# Patient Record
Sex: MALE | Race: ASIAN | NOT HISPANIC OR LATINO | URBAN - METROPOLITAN AREA
[De-identification: names, ages, dates, MRNs, and addresses within clinical notes are randomized per-mention and may not be internally consistent; named-entity substitution may affect disease eponyms.]

---

## 2023-03-16 ENCOUNTER — OUTPATIENT (OUTPATIENT)
Dept: OUTPATIENT SERVICES | Facility: HOSPITAL | Age: 23
LOS: 1 days | End: 2023-03-16

## 2023-03-16 VITALS
OXYGEN SATURATION: 99 % | DIASTOLIC BLOOD PRESSURE: 78 MMHG | HEART RATE: 78 BPM | RESPIRATION RATE: 16 BRPM | HEIGHT: 67 IN | WEIGHT: 199.96 LBS | TEMPERATURE: 98 F | SYSTOLIC BLOOD PRESSURE: 114 MMHG

## 2023-03-16 DIAGNOSIS — M26.02 MAXILLARY HYPOPLASIA: ICD-10-CM

## 2023-03-16 DIAGNOSIS — M26.03 MANDIBULAR HYPERPLASIA: ICD-10-CM

## 2023-03-16 LAB
BLD GP AB SCN SERPL QL: NEGATIVE — SIGNIFICANT CHANGE UP
HCT VFR BLD CALC: 46.7 % — SIGNIFICANT CHANGE UP (ref 39–50)
HGB BLD-MCNC: 14.8 G/DL — SIGNIFICANT CHANGE UP (ref 13–17)
MCHC RBC-ENTMCNC: 26 PG — LOW (ref 27–34)
MCHC RBC-ENTMCNC: 31.7 GM/DL — LOW (ref 32–36)
MCV RBC AUTO: 81.9 FL — SIGNIFICANT CHANGE UP (ref 80–100)
NRBC # BLD: 0 /100 WBCS — SIGNIFICANT CHANGE UP (ref 0–0)
NRBC # FLD: 0 K/UL — SIGNIFICANT CHANGE UP (ref 0–0)
PLATELET # BLD AUTO: 356 K/UL — SIGNIFICANT CHANGE UP (ref 150–400)
RBC # BLD: 5.7 M/UL — SIGNIFICANT CHANGE UP (ref 4.2–5.8)
RBC # FLD: 13.7 % — SIGNIFICANT CHANGE UP (ref 10.3–14.5)
RH IG SCN BLD-IMP: POSITIVE — SIGNIFICANT CHANGE UP
WBC # BLD: 9.56 K/UL — SIGNIFICANT CHANGE UP (ref 3.8–10.5)
WBC # FLD AUTO: 9.56 K/UL — SIGNIFICANT CHANGE UP (ref 3.8–10.5)

## 2023-03-16 RX ORDER — SODIUM CHLORIDE 9 MG/ML
1000 INJECTION, SOLUTION INTRAVENOUS
Refills: 0 | Status: DISCONTINUED | OUTPATIENT
Start: 2023-03-21 | End: 2023-03-22

## 2023-03-16 NOTE — H&P PST ADULT - PROBLEM SELECTOR PLAN 1
Patient tentatively scheduled for  LeFort I osteotomy, bilateral sagittal split osteotomies on 3/21/23.  Pre-op instructions provided. Pt given verbal and written instructions with teach back on pepcid. Pt verbalized understanding with return demonstration.   Preop Covid PCR test ordered .Instructions regarding covid PCR test to be obtained 3- 5 days prior to surgery and locations for covid testing site provided. Pt verbalized understanding Patient tentatively scheduled for  LeFort I osteotomy, bilateral sagittal split osteotomies on 3/21/23.  Pre-op instructions provided. Pt given verbal and written instructions with teach back on Pepcid. Pt verbalized understanding with return demonstration.   Preop Covid PCR test ordered .Instructions regarding covid PCR test to be obtained 3- 5 days prior to surgery and locations for covid testing site provided. Pt verbalized understanding

## 2023-03-16 NOTE — H&P PST ADULT - ENMT COMMENTS
Upper and lower braces Denies dentures. Denies loose teeth.  preop dx: mandibular hyperplasia Mallampati Score -3

## 2023-03-16 NOTE — H&P PST ADULT - HISTORY OF PRESENT ILLNESS
22 year old male with no PMH  presents to Presurgical testing with diagnosis of  mandibular hyperplasia scheduled for LeFort I osteotomy, bilateral sagittal split osteotomies

## 2023-03-16 NOTE — H&P PST ADULT - NSANTHOSAYNRD_GEN_A_CORE
No. YASMIN screening performed.  STOP BANG Legend: 0-2 = LOW Risk; 3-4 = INTERMEDIATE Risk; 5-8 = HIGH Risk

## 2023-03-20 NOTE — ASU PATIENT PROFILE, ADULT - FALL HARM RISK - UNIVERSAL INTERVENTIONS
Bed in lowest position, wheels locked, appropriate side rails in place/Call bell, personal items and telephone in reach/Instruct patient to call for assistance before getting out of bed or chair/Non-slip footwear when patient is out of bed/[place] to call system/Physically safe environment - no spills, clutter or unnecessary equipment/Purposeful Proactive Rounding/Room/bathroom lighting operational, light cord in reach

## 2023-03-21 ENCOUNTER — INPATIENT (INPATIENT)
Facility: HOSPITAL | Age: 23
LOS: 1 days | Discharge: HOME | End: 2023-03-23
Attending: ORAL & MAXILLOFACIAL SURGERY | Admitting: ORAL & MAXILLOFACIAL SURGERY

## 2023-03-21 VITALS
RESPIRATION RATE: 15 BRPM | HEART RATE: 88 BPM | SYSTOLIC BLOOD PRESSURE: 140 MMHG | DIASTOLIC BLOOD PRESSURE: 82 MMHG | TEMPERATURE: 98 F | HEIGHT: 67 IN | OXYGEN SATURATION: 100 % | WEIGHT: 199.96 LBS

## 2023-03-21 DIAGNOSIS — M26.03 MANDIBULAR HYPERPLASIA: ICD-10-CM

## 2023-03-21 DIAGNOSIS — M26.02 MAXILLARY HYPOPLASIA: ICD-10-CM

## 2023-03-21 DEVICE — SCREW AXS SELF TAP 1.7X4MM MUST ORDER IN MULTIPLES OF 5: Type: IMPLANTABLE DEVICE | Status: FUNCTIONAL

## 2023-03-21 DEVICE — IMPLANTABLE DEVICE: Type: IMPLANTABLE DEVICE | Status: FUNCTIONAL

## 2023-03-21 DEVICE — GUIDES VSP CUTTING MARKING: Type: IMPLANTABLE DEVICE | Status: FUNCTIONAL

## 2023-03-21 DEVICE — SCREW SELF TAP CROSSPIN MP 2X6MM MUST ORDER IN MULT OF 5: Type: IMPLANTABLE DEVICE | Status: FUNCTIONAL

## 2023-03-21 DEVICE — SCREW AXS SELF TAP 1.7X8MM: Type: IMPLANTABLE DEVICE | Status: FUNCTIONAL

## 2023-03-21 DEVICE — SCREW AXS SELF DRILL 1.7X4MM MUST ORDER IN MULTIPLES OF 5: Type: IMPLANTABLE DEVICE | Status: FUNCTIONAL

## 2023-03-21 DEVICE — K-WIRE S&N DOUBLE TROCAR 0.045" X 4": Type: IMPLANTABLE DEVICE | Status: FUNCTIONAL

## 2023-03-21 DEVICE — PLATE 5H LT 5MM 100D: Type: IMPLANTABLE DEVICE | Status: FUNCTIONAL

## 2023-03-21 DEVICE — PLATE CVD 6H 4MM: Type: IMPLANTABLE DEVICE | Status: FUNCTIONAL

## 2023-03-21 DEVICE — SURGICEL 2 X 14": Type: IMPLANTABLE DEVICE | Status: FUNCTIONAL

## 2023-03-21 DEVICE — FLOSEAL FAST PREP 10ML: Type: IMPLANTABLE DEVICE | Status: FUNCTIONAL

## 2023-03-21 DEVICE — LIGATING CLIPS WECK HORIZON MEDIUM (BLUE) 24: Type: IMPLANTABLE DEVICE | Status: FUNCTIONAL

## 2023-03-21 DEVICE — SCREW SELF TP CROSSPIN MP 2X4MM MUST ORDER IN MULTIPLES OF 5: Type: IMPLANTABLE DEVICE | Status: FUNCTIONAL

## 2023-03-21 DEVICE — PLATE 5H RT 5MM 100D: Type: IMPLANTABLE DEVICE | Status: FUNCTIONAL

## 2023-03-21 DEVICE — IMP VSP MODELING: Type: IMPLANTABLE DEVICE | Status: FUNCTIONAL

## 2023-03-21 RX ORDER — MORPHINE SULFATE 50 MG/1
2 CAPSULE, EXTENDED RELEASE ORAL ONCE
Refills: 0 | Status: DISCONTINUED | OUTPATIENT
Start: 2023-03-21 | End: 2023-03-21

## 2023-03-21 RX ORDER — ONDANSETRON 8 MG/1
4 TABLET, FILM COATED ORAL ONCE
Refills: 0 | Status: DISCONTINUED | OUTPATIENT
Start: 2023-03-21 | End: 2023-03-22

## 2023-03-21 RX ORDER — LABETALOL HCL 100 MG
5 TABLET ORAL ONCE
Refills: 0 | Status: COMPLETED | OUTPATIENT
Start: 2023-03-21 | End: 2023-03-21

## 2023-03-21 RX ORDER — ACETAMINOPHEN 500 MG
1000 TABLET ORAL ONCE
Refills: 0 | Status: COMPLETED | OUTPATIENT
Start: 2023-03-21 | End: 2023-03-21

## 2023-03-21 RX ORDER — LABETALOL HCL 100 MG
10 TABLET ORAL ONCE
Refills: 0 | Status: COMPLETED | OUTPATIENT
Start: 2023-03-21 | End: 2023-03-21

## 2023-03-21 RX ORDER — PETROLATUM,WHITE
1 JELLY (GRAM) TOPICAL EVERY 8 HOURS
Refills: 0 | Status: DISCONTINUED | OUTPATIENT
Start: 2023-03-21 | End: 2023-03-23

## 2023-03-21 RX ORDER — OXYMETAZOLINE HYDROCHLORIDE 0.5 MG/ML
1 SPRAY NASAL
Refills: 0 | Status: DISCONTINUED | OUTPATIENT
Start: 2023-03-21 | End: 2023-03-23

## 2023-03-21 RX ORDER — METOCLOPRAMIDE HCL 10 MG
10 TABLET ORAL ONCE
Refills: 0 | Status: DISCONTINUED | OUTPATIENT
Start: 2023-03-21 | End: 2023-03-23

## 2023-03-21 RX ORDER — FENTANYL CITRATE 50 UG/ML
25 INJECTION INTRAVENOUS
Refills: 0 | Status: DISCONTINUED | OUTPATIENT
Start: 2023-03-21 | End: 2023-03-21

## 2023-03-21 RX ORDER — OXYCODONE HYDROCHLORIDE 5 MG/1
10 TABLET ORAL EVERY 4 HOURS
Refills: 0 | Status: DISCONTINUED | OUTPATIENT
Start: 2023-03-21 | End: 2023-03-23

## 2023-03-21 RX ORDER — SODIUM CHLORIDE 9 MG/ML
1000 INJECTION, SOLUTION INTRAVENOUS
Refills: 0 | Status: DISCONTINUED | OUTPATIENT
Start: 2023-03-21 | End: 2023-03-22

## 2023-03-21 RX ORDER — ONDANSETRON 8 MG/1
4 TABLET, FILM COATED ORAL EVERY 8 HOURS
Refills: 0 | Status: DISCONTINUED | OUTPATIENT
Start: 2023-03-21 | End: 2023-03-23

## 2023-03-21 RX ORDER — CHLORHEXIDINE GLUCONATE 213 G/1000ML
15 SOLUTION TOPICAL EVERY 12 HOURS
Refills: 0 | Status: DISCONTINUED | OUTPATIENT
Start: 2023-03-21 | End: 2023-03-23

## 2023-03-21 RX ORDER — ACETAMINOPHEN 500 MG
650 TABLET ORAL EVERY 6 HOURS
Refills: 0 | Status: DISCONTINUED | OUTPATIENT
Start: 2023-03-21 | End: 2023-03-23

## 2023-03-21 RX ORDER — METOPROLOL TARTRATE 50 MG
5 TABLET ORAL ONCE
Refills: 0 | Status: COMPLETED | OUTPATIENT
Start: 2023-03-21 | End: 2023-03-21

## 2023-03-21 RX ORDER — HYDROMORPHONE HYDROCHLORIDE 2 MG/ML
0.5 INJECTION INTRAMUSCULAR; INTRAVENOUS; SUBCUTANEOUS
Refills: 0 | Status: DISCONTINUED | OUTPATIENT
Start: 2023-03-21 | End: 2023-03-22

## 2023-03-21 RX ORDER — FLUTICASONE PROPIONATE 50 MCG
1 SPRAY, SUSPENSION NASAL
Refills: 0 | Status: DISCONTINUED | OUTPATIENT
Start: 2023-03-21 | End: 2023-03-23

## 2023-03-21 RX ORDER — IBUPROFEN 200 MG
600 TABLET ORAL EVERY 6 HOURS
Refills: 0 | Status: DISCONTINUED | OUTPATIENT
Start: 2023-03-21 | End: 2023-03-23

## 2023-03-21 RX ORDER — HYDROMORPHONE HYDROCHLORIDE 2 MG/ML
0.25 INJECTION INTRAMUSCULAR; INTRAVENOUS; SUBCUTANEOUS
Refills: 0 | Status: DISCONTINUED | OUTPATIENT
Start: 2023-03-21 | End: 2023-03-22

## 2023-03-21 RX ORDER — OXYCODONE HYDROCHLORIDE 5 MG/1
5 TABLET ORAL ONCE
Refills: 0 | Status: DISCONTINUED | OUTPATIENT
Start: 2023-03-21 | End: 2023-03-21

## 2023-03-21 RX ORDER — OXYCODONE HYDROCHLORIDE 5 MG/1
5 TABLET ORAL EVERY 4 HOURS
Refills: 0 | Status: DISCONTINUED | OUTPATIENT
Start: 2023-03-21 | End: 2023-03-23

## 2023-03-21 RX ORDER — SODIUM CHLORIDE 0.65 %
1 AEROSOL, SPRAY (ML) NASAL
Refills: 0 | Status: DISCONTINUED | OUTPATIENT
Start: 2023-03-21 | End: 2023-03-23

## 2023-03-21 RX ORDER — PENICILLIN V POTASSIUM 250 MG
2 TABLET ORAL EVERY 4 HOURS
Refills: 0 | Status: DISCONTINUED | OUTPATIENT
Start: 2023-03-21 | End: 2023-03-23

## 2023-03-21 RX ADMIN — HYDROMORPHONE HYDROCHLORIDE 0.5 MILLIGRAM(S): 2 INJECTION INTRAMUSCULAR; INTRAVENOUS; SUBCUTANEOUS at 20:00

## 2023-03-21 RX ADMIN — OXYMETAZOLINE HYDROCHLORIDE 1 SPRAY(S): 0.5 SPRAY NASAL at 21:07

## 2023-03-21 RX ADMIN — Medication 400 MILLIGRAM(S): at 21:20

## 2023-03-21 RX ADMIN — Medication 10 MILLIGRAM(S): at 22:50

## 2023-03-21 RX ADMIN — Medication 600 MILLIGRAM(S): at 00:00

## 2023-03-21 RX ADMIN — HYDROMORPHONE HYDROCHLORIDE 0.5 MILLIGRAM(S): 2 INJECTION INTRAMUSCULAR; INTRAVENOUS; SUBCUTANEOUS at 19:12

## 2023-03-21 RX ADMIN — Medication 100 MILLION UNIT(S): at 20:19

## 2023-03-21 RX ADMIN — Medication 5 MILLIGRAM(S): at 23:45

## 2023-03-21 RX ADMIN — Medication 5 MILLIGRAM(S): at 21:05

## 2023-03-21 RX ADMIN — SODIUM CHLORIDE 100 MILLILITER(S): 9 INJECTION, SOLUTION INTRAVENOUS at 18:38

## 2023-03-21 RX ADMIN — HYDROMORPHONE HYDROCHLORIDE 0.5 MILLIGRAM(S): 2 INJECTION INTRAMUSCULAR; INTRAVENOUS; SUBCUTANEOUS at 23:45

## 2023-03-21 RX ADMIN — Medication 1 APPLICATION(S): at 00:00

## 2023-03-21 RX ADMIN — Medication 1000 MILLIGRAM(S): at 22:00

## 2023-03-21 RX ADMIN — HYDROMORPHONE HYDROCHLORIDE 0.5 MILLIGRAM(S): 2 INJECTION INTRAMUSCULAR; INTRAVENOUS; SUBCUTANEOUS at 23:30

## 2023-03-21 RX ADMIN — SODIUM CHLORIDE 30 MILLILITER(S): 9 INJECTION, SOLUTION INTRAVENOUS at 07:18

## 2023-03-21 RX ADMIN — Medication 100 MILLION UNIT(S): at 00:00

## 2023-03-21 RX ADMIN — HYDROMORPHONE HYDROCHLORIDE 0.5 MILLIGRAM(S): 2 INJECTION INTRAMUSCULAR; INTRAVENOUS; SUBCUTANEOUS at 19:30

## 2023-03-21 RX ADMIN — HYDROMORPHONE HYDROCHLORIDE 0.5 MILLIGRAM(S): 2 INJECTION INTRAMUSCULAR; INTRAVENOUS; SUBCUTANEOUS at 19:38

## 2023-03-21 NOTE — PACU DISCHARGE NOTE - COMMENTS
PACU overnight and then to be reassessed for down-grade in the AM. No residual anesthetic issues or complications noted.

## 2023-03-21 NOTE — H&P ADULT - HISTORY OF PRESENT ILLNESS
22 year old male with no PMH  presents with diagnosis of  mandibular hyperplasia scheduled for LeFort I osteotomy, bilateral sagittal split osteotomies

## 2023-03-21 NOTE — H&P ADULT - NSHPADDITIONALINFOADULT_GEN_ALL_CORE
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 I have reviewed treatment and obtained informed consent  Dr Abreu

## 2023-03-21 NOTE — H&P ADULT - PROBLEM SELECTOR PLAN 1
Patient tentatively scheduled for  LeFort I osteotomy, bilateral sagittal split osteotomies on 3/21/23.  Pre-op instructions provided. Pt given verbal and written instructions with teach back on Pepcid. Pt verbalized understanding with return demonstration.   Preop Covid PCR test ordered .Instructions regarding covid PCR test to be obtained 3- 5 days prior to surgery and locations for covid testing site provided. Pt verbalized understanding

## 2023-03-22 RX ORDER — FLUTICASONE PROPIONATE 50 MCG
1 SPRAY, SUSPENSION NASAL
Qty: 0 | Refills: 0 | DISCHARGE
Start: 2023-03-22

## 2023-03-22 RX ORDER — SODIUM CHLORIDE 0.65 %
2 AEROSOL, SPRAY (ML) NASAL
Qty: 0 | Refills: 0 | DISCHARGE
Start: 2023-03-22

## 2023-03-22 RX ORDER — OXYCODONE HYDROCHLORIDE 5 MG/1
5 TABLET ORAL
Qty: 0 | Refills: 0 | DISCHARGE
Start: 2023-03-22

## 2023-03-22 RX ORDER — OXYMETAZOLINE HYDROCHLORIDE 0.5 MG/ML
2 SPRAY NASAL
Qty: 0 | Refills: 0 | DISCHARGE
Start: 2023-03-22

## 2023-03-22 RX ORDER — ACETAMINOPHEN 500 MG
20 TABLET ORAL
Qty: 0 | Refills: 0 | DISCHARGE
Start: 2023-03-22

## 2023-03-22 RX ORDER — CHLORHEXIDINE GLUCONATE 213 G/1000ML
15 SOLUTION TOPICAL
Qty: 0 | Refills: 0 | DISCHARGE
Start: 2023-03-22

## 2023-03-22 RX ORDER — IBUPROFEN 200 MG
30 TABLET ORAL
Qty: 0 | Refills: 0 | DISCHARGE
Start: 2023-03-22

## 2023-03-22 RX ADMIN — OXYCODONE HYDROCHLORIDE 10 MILLIGRAM(S): 5 TABLET ORAL at 01:57

## 2023-03-22 RX ADMIN — Medication 100 MILLION UNIT(S): at 08:38

## 2023-03-22 RX ADMIN — Medication 650 MILLIGRAM(S): at 03:21

## 2023-03-22 RX ADMIN — Medication 650 MILLIGRAM(S): at 23:17

## 2023-03-22 RX ADMIN — Medication 600 MILLIGRAM(S): at 19:00

## 2023-03-22 RX ADMIN — Medication 100 MILLION UNIT(S): at 22:48

## 2023-03-22 RX ADMIN — Medication 100 MILLION UNIT(S): at 18:35

## 2023-03-22 RX ADMIN — Medication 600 MILLIGRAM(S): at 12:15

## 2023-03-22 RX ADMIN — Medication 650 MILLIGRAM(S): at 10:00

## 2023-03-22 RX ADMIN — Medication 650 MILLIGRAM(S): at 22:47

## 2023-03-22 RX ADMIN — Medication 1 APPLICATION(S): at 06:24

## 2023-03-22 RX ADMIN — Medication 1 SPRAY(S): at 08:32

## 2023-03-22 RX ADMIN — OXYMETAZOLINE HYDROCHLORIDE 1 SPRAY(S): 0.5 SPRAY NASAL at 18:34

## 2023-03-22 RX ADMIN — Medication 600 MILLIGRAM(S): at 00:30

## 2023-03-22 RX ADMIN — Medication 650 MILLIGRAM(S): at 16:16

## 2023-03-22 RX ADMIN — Medication 600 MILLIGRAM(S): at 06:45

## 2023-03-22 RX ADMIN — Medication 650 MILLIGRAM(S): at 02:58

## 2023-03-22 RX ADMIN — Medication 1 APPLICATION(S): at 14:23

## 2023-03-22 RX ADMIN — CHLORHEXIDINE GLUCONATE 15 MILLILITER(S): 213 SOLUTION TOPICAL at 18:34

## 2023-03-22 RX ADMIN — OXYCODONE HYDROCHLORIDE 10 MILLIGRAM(S): 5 TABLET ORAL at 02:33

## 2023-03-22 RX ADMIN — Medication 600 MILLIGRAM(S): at 13:00

## 2023-03-22 RX ADMIN — Medication 600 MILLIGRAM(S): at 18:33

## 2023-03-22 RX ADMIN — Medication 650 MILLIGRAM(S): at 09:09

## 2023-03-22 RX ADMIN — Medication 600 MILLIGRAM(S): at 06:15

## 2023-03-22 RX ADMIN — Medication 100 MILLION UNIT(S): at 14:25

## 2023-03-22 RX ADMIN — Medication 1 APPLICATION(S): at 22:48

## 2023-03-22 RX ADMIN — OXYMETAZOLINE HYDROCHLORIDE 1 SPRAY(S): 0.5 SPRAY NASAL at 06:25

## 2023-03-22 RX ADMIN — Medication 100 MILLION UNIT(S): at 00:00

## 2023-03-22 RX ADMIN — Medication 100 MILLION UNIT(S): at 04:17

## 2023-03-22 RX ADMIN — ONDANSETRON 4 MILLIGRAM(S): 8 TABLET, FILM COATED ORAL at 13:53

## 2023-03-22 RX ADMIN — SODIUM CHLORIDE 100 MILLILITER(S): 9 INJECTION, SOLUTION INTRAVENOUS at 12:31

## 2023-03-22 RX ADMIN — CHLORHEXIDINE GLUCONATE 15 MILLILITER(S): 213 SOLUTION TOPICAL at 06:24

## 2023-03-22 RX ADMIN — Medication 650 MILLIGRAM(S): at 17:00

## 2023-03-22 NOTE — DISCHARGE NOTE PROVIDER - HOSPITAL COURSE
3/21/22  21 yr old male well known to Dr. Abreu presenting to [facility] for LeFort 1, BSSO, and genioplasty with Dr. Abreu in the OR today 3/21/22.  Procedure went well without complication and patient had uneventful post operative course.     3/22/23 HD2 POD1  patient visited bedside in PACU. Patient hypertensive overnight, recieved labetalol, tolerated well. patient afebrile, vitals stable. patient is healing well appropriate for post-operative course. patient has been drinking, ambulating, and voiding. gingiva is pink and well-perfused. sutures are clean, closed, and in tact. maxillary and mandibular segments are stable. patient reports pain 4/10 to be continued to be managed with PRN pain medication. elastics are in place. no excess heme noted. NGT removed. A-line and troncoso in place.  Jaw bra removed. encouraging voiding, drinking, and ambulating.      3/21/22  21 yr old male well known to Dr. Abreu presenting to [facility] for LeFort 1, BSSO, and genioplasty with Dr. Abreu in the OR today 3/21/22.  Procedure went well without complication and patient had uneventful post operative course.     3/22/23 HD2 POD1  patient visited bedside in PACU. Patient hypertensive overnight, recieved labetalol, tolerated well. patient afebrile, vitals stable. patient is healing well appropriate for post-operative course. patient has been drinking, ambulating, and voiding. gingiva is pink and well-perfused. sutures are clean, closed, and in tact. maxillary and mandibular segments are stable. patient reports pain 4/10 to be continued to be managed with PRN pain medication. elastics are in place. no excess heme noted. NGT removed. A-line and troncoso in place.  Jaw bra removed. encouraging voiding, drinking, and ambulating.     3/23/23 HD3 POD2  Patient visited bedside on floor. Patient afebrile, vitals stable. patient is healing well appropriate for post-operative course. patient has been drinking, ambulating, and voiding. gingiva is pink and well-perfused. sutures are clean, closed, and in tact. maxillary and mandibular segments are stable. patient reports pain 4/10 to be continued to be managed with PRN pain medication. elastics are in place. no excess heme noted. NGT removed. A-line and troncoso in place.  Jaw bra removed. encouraging voiding, drinking, and ambulating.

## 2023-03-22 NOTE — PROGRESS NOTE ADULT - SUBJECTIVE AND OBJECTIVE BOX
3/22/23 HD2 POD1    patient visited bedside in PACU. Patient hypertensive overnight, recieved labetalol, tolerated well. patient afebrile, vitals stable. patient is healing well appropriate for post-operative coruse. patient has been drinking, ambulating, and voiding. gingiva is pink and well-perfused. sutures are clean, closed, and in tact. maxillary and mandibular segments are stable. patient reports pain 4/10 to be continued to be managed with PRN pain medication. elastics are in place. no excess heme noted. NGT removed. A-line and troncoso in place.  Jaw bra removed. encouraging voiding, drinking, and ambulating.     HPI:  22 yr old male with a history of a dentofacial deformity well known to Dr. Abreu presenting to [facility] for LeFort, BSSO, and genioplasty in the OR with Dr. Abreu. Procedure went well. Patient tolerated procedure well.       General: aaox3. well-appearing. no apparent distress.     HEENT:  Head: normocephalic  E: normal hearing  E: PERRL, no conjunctival hemmorage noted  N: nares clear, minimal heme consistent with post-operative bleeding  T: neck soft and supple, no sings of indurations. no LAD    Intraoral exam:  gingiva is pink and well-perfused. sutures are clean, closed, and in tact. maxillary and mandibular segments are stable. patient progressing well and healing appropriately.     T(C): 37.1 (03-22-23 @ 06:00), Max: 37.9 (03-21-23 @ 21:00)  HR: 86 (03-22-23 @ 06:00) (86 - 113)  BP: 134/73 (03-22-23 @ 06:00) (111/56 - 168/98)  RR: 12 (03-22-23 @ 06:00) (10 - 21)  SpO2: 96% (03-22-23 @ 06:00) (96% - 100%)      03-21-23 @ 07:01  -  03-22-23 @ 07:00  --------------------------------------------------------  IN: 1780 mL / OUT: 1095 mL / NET: 685 mL        acetaminophen   Oral Liquid .. 650 milliGRAM(s) Oral every 6 hours  AQUAPHOR (petrolatum Ointment) 1 Application(s) Topical every 8 hours  chlorhexidine 0.12% Liquid 15 milliLiter(s) Oral Mucosa every 12 hours  fluticasone propionate 50 MICROgram(s)/spray Nasal Spray 1 Spray(s) Both Nostrils <User Schedule>  HYDROmorphone  Injectable 0.25 milliGRAM(s) IV Push every 10 minutes PRN  HYDROmorphone  Injectable 0.5 milliGRAM(s) IV Push every 10 minutes PRN  ibuprofen  Suspension. 600 milliGRAM(s) Oral every 6 hours  lactated ringers. 1000 milliLiter(s) IV Continuous <Continuous>  lactated ringers. 1000 milliLiter(s) IV Continuous <Continuous>  metoclopramide Injectable 10 milliGRAM(s) IV Push once  ondansetron Injectable 4 milliGRAM(s) IV Push every 8 hours PRN  ondansetron Injectable 4 milliGRAM(s) IV Push once PRN  oxyCODONE    Solution 5 milliGRAM(s) Oral every 4 hours PRN  oxyCODONE    Solution 10 milliGRAM(s) Oral every 4 hours PRN  oxymetazoline 0.05% Nasal Spray 1 Spray(s) Both Nostrils two times a day  penicillin   G  potassium  IVPB 2 Million Unit(s) IV Intermittent every 4 hours  sodium chloride 0.65% Nasal 1 Spray(s) Both Nostrils every 2 hours PRN

## 2023-03-22 NOTE — DISCHARGE NOTE PROVIDER - NSDCMRMEDTOKEN_GEN_ALL_CORE_FT
acetaminophen 650 mg/20.3 mL oral suspension: 20 milliliter(s) orally every 6 hours, As Needed for moderate pain.   Augmentin 400 mg-57 mg/5 mL oral liquid: 10 milliliter(s) orally every 12 hours  chlorhexidine 0.12% mucous membrane liquid: 15 milliliter(s) mucous membrane 2 times a day  fluticasone 50 mcg/inh nasal spray: 1 spray(s) in each nostril once a day  ibuprofen 100 mg/5 mL oral suspension: 30 milliliter(s) orally every 6 hours, As Needed for moderate pain.   none as per patient:   oxyCODONE 5 mg/5 mL oral solution: 5 milliliter(s) orally every 6 hours, As Needed for severe pain.   oxymetazoline 0.05% nasal spray: 2 spray(s) nasal 2 times a day. STOP FRIDAY 3/24/23  sodium chloride 0.65% nasal solution: 2 drop(s) nasal every 2 hours, As Needed - for congestion

## 2023-03-22 NOTE — PATIENT PROFILE ADULT - FALL HARM RISK - HARM RISK INTERVENTIONS

## 2023-03-22 NOTE — DISCHARGE NOTE PROVIDER - NSDCCPCAREPLAN_GEN_ALL_CORE_FT
PRINCIPAL DISCHARGE DIAGNOSIS  Diagnosis: Mandibular hyperplasia  Assessment and Plan of Treatment:

## 2023-03-22 NOTE — PROGRESS NOTE ADULT - ASSESSMENT
22yr old male pt well known to Dr. Abreu with a PMH of a dentofacial deformity s/p Maxillary LeFort 1 osteotomy, mandibular bilateral sagittal split osteotomies and genioplasty with Dr. Abreu in the OR on 3/21/23. Patient progressing well with normal post operative course.    Plan:  - step down to floor from PACU  - d/c [place] and a-line prior to floor transfer  -encourage drinking, voiding, and ambulating  -strict I&O  - monitor HTN    Hector Tobar DDS  [facility] pager: [phone]   [place]: [phone]  [employer]iable on teams

## 2023-03-22 NOTE — DISCHARGE NOTE PROVIDER - CARE PROVIDER_API CALL
Marcus Abreu (DDS)  OralMaxillofacial Surgery  [address], [address]  [place] [address]  Phone: [phone]  Fax: [phone]  Established Patient  Follow Up Time: 1 week

## 2023-03-23 VITALS
OXYGEN SATURATION: 98 % | TEMPERATURE: 99 F | DIASTOLIC BLOOD PRESSURE: 78 MMHG | SYSTOLIC BLOOD PRESSURE: 135 MMHG | RESPIRATION RATE: 17 BRPM | HEART RATE: 86 BPM

## 2023-03-23 RX ORDER — SODIUM CHLORIDE 0.65 %
2 AEROSOL, SPRAY (ML) NASAL
Qty: 1 | Refills: 0
Start: 2023-03-23 | End: 2023-03-28

## 2023-03-23 RX ORDER — CHLORHEXIDINE GLUCONATE 213 G/1000ML
15 SOLUTION TOPICAL
Qty: 1 | Refills: 0
Start: 2023-03-23 | End: 2023-04-06

## 2023-03-23 RX ORDER — IBUPROFEN 200 MG
30 TABLET ORAL
Qty: 720 | Refills: 0
Start: 2023-03-23 | End: 2023-03-28

## 2023-03-23 RX ORDER — ACETAMINOPHEN 500 MG
20 TABLET ORAL
Qty: 480 | Refills: 0
Start: 2023-03-23 | End: 2023-03-28

## 2023-03-23 RX ORDER — OXYCODONE HYDROCHLORIDE 5 MG/1
1 TABLET ORAL
Qty: 12 | Refills: 0
Start: 2023-03-23 | End: 2023-03-25

## 2023-03-23 RX ADMIN — Medication 1 APPLICATION(S): at 06:43

## 2023-03-23 RX ADMIN — Medication 650 MILLIGRAM(S): at 04:02

## 2023-03-23 RX ADMIN — Medication 100 MILLION UNIT(S): at 09:56

## 2023-03-23 RX ADMIN — Medication 100 MILLION UNIT(S): at 06:42

## 2023-03-23 RX ADMIN — Medication 600 MILLIGRAM(S): at 01:18

## 2023-03-23 RX ADMIN — Medication 100 MILLION UNIT(S): at 02:32

## 2023-03-23 RX ADMIN — Medication 650 MILLIGRAM(S): at 04:32

## 2023-03-23 RX ADMIN — Medication 1 SPRAY(S): at 07:48

## 2023-03-23 RX ADMIN — Medication 600 MILLIGRAM(S): at 01:48

## 2023-03-23 RX ADMIN — CHLORHEXIDINE GLUCONATE 15 MILLILITER(S): 213 SOLUTION TOPICAL at 06:43

## 2023-03-23 RX ADMIN — ONDANSETRON 4 MILLIGRAM(S): 8 TABLET, FILM COATED ORAL at 04:02

## 2023-03-23 RX ADMIN — Medication 650 MILLIGRAM(S): at 09:56

## 2023-03-23 RX ADMIN — Medication 600 MILLIGRAM(S): at 06:43

## 2023-03-23 RX ADMIN — Medication 600 MILLIGRAM(S): at 07:13

## 2023-03-23 RX ADMIN — OXYMETAZOLINE HYDROCHLORIDE 1 SPRAY(S): 0.5 SPRAY NASAL at 06:43

## 2023-03-23 RX ADMIN — Medication 650 MILLIGRAM(S): at 10:35

## 2023-03-23 NOTE — PROGRESS NOTE ADULT - SUBJECTIVE AND OBJECTIVE BOX
3/22/23 HD2 POD1    Patient visited bedside on floor. Patient afebrile, vitals stable. patient is healing well appropriate for post-operative course. patient has been drinking, ambulating, and voiding. gingiva is pink and well-perfused. sutures are clean, closed, and in tact. maxillary and mandibular segments are stable. patient reports pain 4/10 to be continued to be managed with PRN pain medication. elastics are in place. no excess heme noted. NGT removed. A-line and troncoso in place.  Jaw bra removed. encouraging voiding, drinking, and ambulating.     HPI:  22 yr old male with a history of a dentofacial deformity well known to Dr. Abreu presenting to [facility] for LeFort, BSSO, and genioplasty in the OR with Dr. Abreu. Procedure went well. Patient tolerated procedure well.       General: aaox3. well-appearing. no apparent distress.     HEENT:  Head: normocephalic  E: normal hearing  E: PERRL, no conjunctival hemmorage noted  N: nares clear, minimal heme consistent with post-operative bleeding  T: neck soft and supple, no sings of indurations. no LAD    Intraoral exam:  gingiva is pink and well-perfused. sutures are clean, closed, and in tact. maxillary and mandibular segments are stable. patient progressing well and healing appropriately.     Vital Signs Last 24 Hrs  T(C): 37.1 (23 Mar 2023 02:10), Max: 37.3 (22 Mar 2023 17:00)  T(F): 98.8 (23 Mar 2023 02:10), Max: 99.1 (22 Mar 2023 17:00)  HR: 87 (23 Mar 2023 02:10) (71 - 104)  BP: 135/89 (23 Mar 2023 02:10) (106/93 - 157/81)  BP(mean): 95 (22 Mar 2023 18:00) (82 - 102)  RR: 19 (23 Mar 2023 02:10) (12 - 20)  SpO2: 100% (23 Mar 2023 02:10) (97% - 100%)    Parameters below as of 23 Mar 2023 02:10  Patient On (Oxygen Delivery Method): room air    acetaminophen   Oral Liquid .. 650 milliGRAM(s) Oral every 6 hours  AQUAPHOR (petrolatum Ointment) 1 Application(s) Topical every 8 hours  chlorhexidine 0.12% Liquid 15 milliLiter(s) Oral Mucosa every 12 hours  fluticasone propionate 50 MICROgram(s)/spray Nasal Spray 1 Spray(s) Both Nostrils <User Schedule>  HYDROmorphone  Injectable 0.25 milliGRAM(s) IV Push every 10 minutes PRN  HYDROmorphone  Injectable 0.5 milliGRAM(s) IV Push every 10 minutes PRN  ibuprofen  Suspension. 600 milliGRAM(s) Oral every 6 hours  lactated ringers. 1000 milliLiter(s) IV Continuous <Continuous>  lactated ringers. 1000 milliLiter(s) IV Continuous <Continuous>  metoclopramide Injectable 10 milliGRAM(s) IV Push once  ondansetron Injectable 4 milliGRAM(s) IV Push every 8 hours PRN  ondansetron Injectable 4 milliGRAM(s) IV Push once PRN  oxyCODONE    Solution 5 milliGRAM(s) Oral every 4 hours PRN  oxyCODONE    Solution 10 milliGRAM(s) Oral every 4 hours PRN  oxymetazoline 0.05% Nasal Spray 1 Spray(s) Both Nostrils two times a day  penicillin   G  potassium  IVPB 2 Million Unit(s) IV Intermittent every 4 hours  sodium chloride 0.65% Nasal 1 Spray(s) Both Nostrils every 2 hours PRN   3/22/23 HD3 POD2    Patient visited bedside on floor. Patient afebrile, vitals stable. patient is healing well appropriate for post-operative course. patient has been drinking, ambulating, and voiding. gingiva is pink and well-perfused. sutures are clean, closed, and in tact. maxillary and mandibular segments are stable. patient reports pain 4/10 to be continued to be managed with PRN pain medication. elastics are in place. no excess heme noted. NGT removed. A-line and troncoso in place.  Jaw bra removed. encouraging voiding, drinking, and ambulating.     HPI:  22 yr old male with a history of a dentofacial deformity well known to Dr. Abreu presenting to [facility] for LeFort, BSSO, and genioplasty in the OR with Dr. Abreu. Procedure went well. Patient tolerated procedure well.       General: aaox3. well-appearing. no apparent distress.     HEENT:  Head: normocephalic  E: normal hearing  E: PERRL, no conjunctival hemmorage noted  N: nares clear, minimal heme consistent with post-operative bleeding  T: neck soft and supple, no sings of indurations. no LAD    Intraoral exam:  gingiva is pink and well-perfused. sutures are clean, closed, and in tact. maxillary and mandibular segments are stable. patient progressing well and healing appropriately.     Vital Signs Last 24 Hrs  T(C): 37.1 (23 Mar 2023 02:10), Max: 37.3 (22 Mar 2023 17:00)  T(F): 98.8 (23 Mar 2023 02:10), Max: 99.1 (22 Mar 2023 17:00)  HR: 87 (23 Mar 2023 02:10) (71 - 104)  BP: 135/89 (23 Mar 2023 02:10) (106/93 - 157/81)  BP(mean): 95 (22 Mar 2023 18:00) (82 - 102)  RR: 19 (23 Mar 2023 02:10) (12 - 20)  SpO2: 100% (23 Mar 2023 02:10) (97% - 100%)    Parameters below as of 23 Mar 2023 02:10  Patient On (Oxygen Delivery Method): room air    acetaminophen   Oral Liquid .. 650 milliGRAM(s) Oral every 6 hours  AQUAPHOR (petrolatum Ointment) 1 Application(s) Topical every 8 hours  chlorhexidine 0.12% Liquid 15 milliLiter(s) Oral Mucosa every 12 hours  fluticasone propionate 50 MICROgram(s)/spray Nasal Spray 1 Spray(s) Both Nostrils <User Schedule>  HYDROmorphone  Injectable 0.25 milliGRAM(s) IV Push every 10 minutes PRN  HYDROmorphone  Injectable 0.5 milliGRAM(s) IV Push every 10 minutes PRN  ibuprofen  Suspension. 600 milliGRAM(s) Oral every 6 hours  lactated ringers. 1000 milliLiter(s) IV Continuous <Continuous>  lactated ringers. 1000 milliLiter(s) IV Continuous <Continuous>  metoclopramide Injectable 10 milliGRAM(s) IV Push once  ondansetron Injectable 4 milliGRAM(s) IV Push every 8 hours PRN  ondansetron Injectable 4 milliGRAM(s) IV Push once PRN  oxyCODONE    Solution 5 milliGRAM(s) Oral every 4 hours PRN  oxyCODONE    Solution 10 milliGRAM(s) Oral every 4 hours PRN  oxymetazoline 0.05% Nasal Spray 1 Spray(s) Both Nostrils two times a day  penicillin   G  potassium  IVPB 2 Million Unit(s) IV Intermittent every 4 hours  sodium chloride 0.65% Nasal 1 Spray(s) Both Nostrils every 2 hours PRN

## 2023-03-23 NOTE — DISCHARGE NOTE NURSING/CASE MANAGEMENT/SOCIAL WORK - NSDCPEFALRISK_GEN_ALL_CORE
For information on Fall & Injury Prevention, visit: [email or web address] OR  [email or web address] OR  https://www.cdc.gov/steadi/patient.html

## 2023-03-23 NOTE — DISCHARGE NOTE NURSING/CASE MANAGEMENT/SOCIAL WORK - PATIENT PORTAL LINK FT
You can access the FollowMyHealth Patient Portal offered by [facility] by registering at the following website: [email or web address]. By joining [facility]’s FollowMyHealth portal, you will also be able to view your health information using other applications (apps) compatible with our system.

## 2023-03-23 NOTE — PROGRESS NOTE ADULT - ASSESSMENT
22yr old male pt well known to Dr. Abreu with a PMH of a dentofacial deformity s/p Maxillary LeFort 1 osteotomy, mandibular bilateral sagittal split osteotomies and genioplasty with Dr. Abreu in the OR on 3/21/23. Patient progressing well with normal post operative course.    Plan:  - Planning for discharge this am  - Encourage drinking, voiding, and ambulating  - Monitor HTN  - Multimodal pain control   - Rx's sent from private office    Devan Gary DDS  [facility] pager: [phone]   Available on [employer] Teams

## 2023-04-04 NOTE — ASU PREOP CHECKLIST - SELECT TESTS ORDERED
BMP/Type and Screen PROCEDURES:  Chest tube placement 04-Apr-2023 12:14:59  Mikala Villarreal   BMP/Type and Screen/COVID-19

## (undated) DEVICE — Device

## (undated) DEVICE — VENODYNE/SCD SLEEVE CALF MEDIUM

## (undated) DEVICE — BUR STRYKER OVAL CARBIDE 4MM

## (undated) DEVICE — RASP STRYKER LARGE TEAR CROSSCUT 14X7MM DISP

## (undated) DEVICE — SYR LUER LOK 20CC

## (undated) DEVICE — SUT CHROMIC 4-0 27" RB-1

## (undated) DEVICE — PREP BETADINE KIT

## (undated) DEVICE — DRAIN PENROSE .25" X 18" LATEX

## (undated) DEVICE — FOLEY TRAY 14FR 5CC LF UMETER CLOSED

## (undated) DEVICE — BRA JAW

## (undated) DEVICE — SYR LUER LOK 10CC

## (undated) DEVICE — BUR STRYKER CROSS CUT FISSURE CARBIDE 1.6X44.5MM

## (undated) DEVICE — ELCTR GROUNDING PAD ADULT COVIDIEN

## (undated) DEVICE — SOL IRR POUR NS 0.9% 500ML

## (undated) DEVICE — GLV 8 PROTEXIS (CREAM) MICRO

## (undated) DEVICE — BIPOLAR FORCEP CORD WECK STANDARD 12FT

## (undated) DEVICE — NDL HYPO REGULAR BEVEL 25G X 1.5" (BLUE)

## (undated) DEVICE — ELCTR BOVIE TIP BLADE INSULATED 2.75" EDGE

## (undated) DEVICE — DRILL BIT STRYKER CRANIOMAXILLOFACIAL 1.5X20MM

## (undated) DEVICE — DRILL BIT STRYKER CRANIOMAXILLOFACIAL TWIST 1.35X50MM

## (undated) DEVICE — DRAPE MAYO STAND 23"

## (undated) DEVICE — ELCTR COLORADO 3CM

## (undated) DEVICE — PACK DENTAL

## (undated) DEVICE — LABELS BLANK W PEN

## (undated) DEVICE — DRILL BIT STRYKER CRANIOMAXILLOFACIAL 14X54MM 12MM ST

## (undated) DEVICE — BUR STRYKER CARBIDE CROSS CUT FISSURE 1MM

## (undated) DEVICE — YELLOW PIN COVER

## (undated) DEVICE — DRILL BIT STRYKER CRANIOMAXILLOFACIAL 1.5MM

## (undated) DEVICE — WARMING BLANKET FULL ADULT

## (undated) DEVICE — STAPLER SKIN VISI-STAT 35 WIDE

## (undated) DEVICE — DRILL BIT STRYKER CRANIOMAXILLOFACIAL 1.5X8MM

## (undated) DEVICE — POSITIONER FOAM EGG CRATE ULNAR 2PCS (PINK)

## (undated) DEVICE — DRAPE 3/4 SHEET 52X76"

## (undated) DEVICE — SAW BLADE STRYKER RECIPROCATING 22.5MMX0.38MM

## (undated) DEVICE — SUT VICRYL 2-0 27" SH UNDYED

## (undated) DEVICE — SUT CHROMIC 3-0 27" RB-1

## (undated) DEVICE — DRAPE TOWEL BLUE 17" X 24"